# Patient Record
Sex: FEMALE | Race: WHITE | URBAN - METROPOLITAN AREA
[De-identification: names, ages, dates, MRNs, and addresses within clinical notes are randomized per-mention and may not be internally consistent; named-entity substitution may affect disease eponyms.]

---

## 2018-02-13 ENCOUNTER — OFFICE VISIT (OUTPATIENT)
Dept: URGENT CARE | Facility: URGENT CARE | Age: 33
End: 2018-02-13
Payer: COMMERCIAL

## 2018-02-13 VITALS
TEMPERATURE: 97.9 F | DIASTOLIC BLOOD PRESSURE: 92 MMHG | HEART RATE: 99 BPM | SYSTOLIC BLOOD PRESSURE: 141 MMHG | WEIGHT: 205 LBS

## 2018-02-13 DIAGNOSIS — N76.0 VAGINITIS AND VULVOVAGINITIS: Primary | ICD-10-CM

## 2018-02-13 LAB
ALBUMIN UR-MCNC: NEGATIVE MG/DL
APPEARANCE UR: CLEAR
BILIRUB UR QL STRIP: NEGATIVE
COLOR UR AUTO: YELLOW
GLUCOSE UR STRIP-MCNC: NEGATIVE MG/DL
HGB UR QL STRIP: NEGATIVE
KETONES UR STRIP-MCNC: NEGATIVE MG/DL
LEUKOCYTE ESTERASE UR QL STRIP: NEGATIVE
NITRATE UR QL: NEGATIVE
PH UR STRIP: 6 PH (ref 5–7)
SOURCE: NORMAL
SP GR UR STRIP: 1.02 (ref 1–1.03)
SPECIMEN SOURCE: NORMAL
UROBILINOGEN UR STRIP-ACNC: 0.2 EU/DL (ref 0.2–1)
WET PREP SPEC: NORMAL

## 2018-02-13 PROCEDURE — 87210 SMEAR WET MOUNT SALINE/INK: CPT | Performed by: PHYSICIAN ASSISTANT

## 2018-02-13 PROCEDURE — 99203 OFFICE O/P NEW LOW 30 MIN: CPT | Performed by: PHYSICIAN ASSISTANT

## 2018-02-13 PROCEDURE — 81003 URINALYSIS AUTO W/O SCOPE: CPT | Performed by: PHYSICIAN ASSISTANT

## 2018-02-13 RX ORDER — DOCUSATE SODIUM 100 MG/1
TABLET ORAL
Refills: 0 | COMMUNITY
Start: 2017-04-05

## 2018-02-13 RX ORDER — METRONIDAZOLE 500 MG/1
500 TABLET ORAL 2 TIMES DAILY
Qty: 14 TABLET | Refills: 0 | Status: SHIPPED | OUTPATIENT
Start: 2018-02-13 | End: 2018-02-20

## 2018-02-13 ASSESSMENT — ENCOUNTER SYMPTOMS
PALPITATIONS: 0
DYSURIA: 0
HEMATURIA: 0
HEMOPTYSIS: 0
DIAPHORESIS: 0
FEVER: 0
FREQUENCY: 0
WEIGHT LOSS: 0
RESPIRATORY NEGATIVE: 1
COUGH: 0
CONSTITUTIONAL NEGATIVE: 1
GASTROINTESTINAL NEGATIVE: 1
CARDIOVASCULAR NEGATIVE: 1
EYE PAIN: 0

## 2018-02-13 NOTE — MR AVS SNAPSHOT
"              After Visit Summary   2018    Tati Kamara    MRN: [id number]           Patient Information     Date Of Birth          1985        Visit Information        Provider Department      2018 8:35 PM Mariia Wharton PA-C [facility]        Today's Diagnoses     Vaginitis and vulvovaginitis    -  1       Follow-ups after your visit        Who to contact     If you have questions or need follow up information about today's clinic visit or your schedule please contact [facility] directly at [phone].  Normal or non-critical lab and imaging results will be communicated to you by [employer], letter or phone within 4 business days after the clinic has received the results. If you do not hear from us within 7 days, please contact the clinic through [employer] or phone. If you have a critical or abnormal lab result, we will notify you by phone as soon as possible.  Submit refill requests through [employer] or call your pharmacy and they will forward the refill request to us. Please allow 3 business days for your refill to be completed.          Additional Information About Your Visit        MyChart Information     [employer] lets you send messages to your doctor, view your test results, renew your prescriptions, schedule appointments and more. To sign up, go to [email or web address] . Click on \"Log in\" on the left side of the screen, which will take you to the Welcome page. Then click on \"Sign up Now\" on the right side of the page.     You will be asked to enter the access code listed below, as well as some personal information. Please follow the directions to create your username and password.     Your access code is: [email or web address]  Expires: 2018  9:26 PM     Your access code will  in 90 days. If you need help or a new code, please call your [facility] or [phone].        Care EveryWhere ID     This is your Care EveryWhere ID. This could be used by other " organizations to access your [facility] medical records  [id number]        Your Vitals Were     Pulse Temperature                99 97.9  F (36.6  C) (Oral)           Blood Pressure from Last 3 Encounters:   02/13/18 (!) 141/92    Weight from Last 3 Encounters:   02/13/18 205 lb (93 kg)              We Performed the Following     *UA reflex to Microscopic and Culture ([facility] (except [place] and [place])     Wet prep          Today's Medication Changes          These changes are accurate as of 2/13/18  9:26 PM.  If you have any questions, ask your nurse or doctor.               Start taking these medicines.        Dose/Directions    metroNIDAZOLE 500 MG tablet   Commonly known as:  FLAGYL   Used for:  Vaginitis and vulvovaginitis        Dose:  500 mg   Take 1 tablet (500 mg) by mouth 2 times daily for 7 days   Quantity:  14 tablet   Refills:  0            Where to get your medicines      These medications were sent to [facility] [id number] [place] [address] [facility]  [address] [facility] [address]    Hours:  24-hours Phone:  [phone]     metroNIDAZOLE 500 MG tablet                Primary Care Provider Office Phone # Fax #    [facility] [phone] [address] [facility] [address]        Equal Access to Services     OSCAR NUÑEZ AH: Hadii zaida ndiaye hadasho Soomaali, waaxda luqadaha, qaybta kaalmada adeegyada, manasa desai hayannabelle green. So [facility] [phone].    ATENCIÓN: Si habla español, tiene a reyes disposición servicios gratuitos de asistencia lingüística. Llame al [phone].    We comply with applicable federal civil rights laws and [place] laws. We do not discriminate on the basis of race, color, national origin, age, disability, sex, sexual orientation, or gender identity.            Thank you!     Thank you for choosing [facility]  for your care.  Our goal is always to provide you with excellent care. Hearing back from our patients is one way we can continue to improve our services. Please take a few minutes to complete the written survey that you may receive in the mail after your visit with us. Thank you!             Your Updated Medication List - Protect others around you: Learn how to safely use, store and throw away your medicines at www.disposemymeds.org.          This list is accurate as of 2/13/18  9:26 PM.  Always use your most recent med list.                   Brand Name Dispense Instructions for use Diagnosis    DOCQLACE 100 MG capsule   Generic drug:  docusate sodium           metroNIDAZOLE 500 MG tablet    FLAGYL    14 tablet    Take 1 tablet (500 mg) by mouth 2 times daily for 7 days    Vaginitis and vulvovaginitis

## 2018-02-14 NOTE — PROGRESS NOTES
SUBJECTIVE:                                                         HPI   Tati Kamara is a 32 year old female who presents to clinic today for the following health issues:  Vaginal Symptoms    Duration: X 1 week.  Was seen at her PCP's office last week in which workup for vaginal discharge was negative.  Had STD testing done which is pending.    Description  vaginal discharge with slight itching and burning.  No bleeding.    Intensity:  Getting worse after her recent transvaginal US    Accompanying signs and symptoms (fever/dysuria/abdominal or back pain): urinary urgency but no dysuria, urinary frequency, or hematuria.  No abdominal pain, n/v, constipation, diarrhea, bloody or black tarry stools.  No fever, chills or sweats.    History:  Sexually active: not at present  Possibility of pregnancy: No  Recent antibiotic use: no.  No foreign body insertion.     Precipitating or alleviating factors: None    Therapies tried and outcome: none      Reviewed PMH.    Current Outpatient Prescriptions   Medication Sig Dispense Refill     DOCQLACE 100 MG capsule   0     Allergies   Allergen Reactions     Ibuprofen Other (See Comments)     bruising       Review of Systems   Constitutional: Negative.  Negative for diaphoresis, fever and weight loss.   Eyes: Negative for pain.   Respiratory: Negative.  Negative for cough and hemoptysis.    Cardiovascular: Negative.  Negative for chest pain and palpitations.   Gastrointestinal: Negative.    Genitourinary: Positive for urgency. Negative for dysuria, frequency and hematuria.   Skin: Negative.    All other systems reviewed and are negative.      BP (!) 141/92  Pulse 99  Temp 97.9  F (36.6  C) (Oral)  Wt 205 lb (93 kg)  Physical Exam   Constitutional: She is oriented to person, place, and time and well-developed, well-nourished, and in no distress. No distress.   Cardiovascular: Normal rate, regular rhythm, normal heart sounds and intact distal pulses.  Exam reveals no  gallop and no friction rub.    No murmur heard.  Pulmonary/Chest: Effort normal and breath sounds normal. No respiratory distress. She has no wheezes. She has no rales.   Abdominal: Soft. Normal appearance, normal aorta and bowel sounds are normal. She exhibits no mass. There is no hepatosplenomegaly. There is no tenderness. There is no rebound, no guarding, no CVA tenderness, no tenderness at McBurney's point and negative Rodriguez's sign.   Genitourinary:   Genitourinary Comments: Declined pelvic and bimanual exam.   Neurological: She is alert and oriented to person, place, and time.   Skin: Skin is warm and dry.   Psychiatric: Mood, memory, affect and judgment normal.   Nursing note and vitals reviewed.        Assessment/Plan:  Vaginitis and vulvovaginitis:  Wet prep and UA were negative.  Will empirically treat with metronidazole X1week based on H&P.  No ETOH while on medications due to disulfuram reaction.  Also recommend probiotics/yogurt as well.  Avoid foreign body insertion, scented personal care products and frequent baths.  Declined STD testing as she has already had this done recently at her PCP's office.  Recheck in clinic if symptoms worsen or if symptoms do not improve.    -     Wet prep  -     *UA reflex to Microscopic and Culture ([place] [facility] (except [place] and [place])  -     metroNIDAZOLE (FLAGYL) 500 MG tablet; Take 1 tablet (500 mg) by mouth 2 times daily for 7 days          Mariia See MARIA E Wharton

## 2021-01-06 ENCOUNTER — TRANSFERRED RECORDS (OUTPATIENT)
Dept: HEALTH INFORMATION MANAGEMENT | Facility: CLINIC | Age: 36
End: 2021-01-06